# Patient Record
Sex: MALE | Race: WHITE
[De-identification: names, ages, dates, MRNs, and addresses within clinical notes are randomized per-mention and may not be internally consistent; named-entity substitution may affect disease eponyms.]

---

## 2018-08-28 ENCOUNTER — HOSPITAL ENCOUNTER (EMERGENCY)
Dept: HOSPITAL 63 - ER | Age: 48
LOS: 1 days | Discharge: HOME | End: 2018-08-29
Payer: COMMERCIAL

## 2018-08-28 VITALS — HEIGHT: 70 IN | BODY MASS INDEX: 36.74 KG/M2 | WEIGHT: 256.62 LBS

## 2018-08-28 DIAGNOSIS — N13.2: Primary | ICD-10-CM

## 2018-08-28 DIAGNOSIS — M19.90: ICD-10-CM

## 2018-08-28 LAB
ALBUMIN SERPL-MCNC: 4.1 G/DL (ref 3.4–5)
ALBUMIN/GLOB SERPL: 1.2 {RATIO} (ref 1–1.7)
ALP SERPL-CCNC: 123 U/L (ref 46–116)
ALT SERPL-CCNC: 111 U/L (ref 16–63)
AMORPH SED URNS QL MICRO: PRESENT /HPF
ANION GAP SERPL CALC-SCNC: 8 MMOL/L (ref 6–14)
APTT PPP: (no result) S
AST SERPL-CCNC: 67 U/L (ref 15–37)
BACTERIA #/AREA URNS HPF: 0 /HPF
BASOPHILS # BLD AUTO: 0.1 X10^3/UL (ref 0–0.2)
BASOPHILS NFR BLD: 1 % (ref 0–3)
BILIRUB SERPL-MCNC: 0.7 MG/DL (ref 0.2–1)
BILIRUB UR QL STRIP: (no result)
BUN/CREAT SERPL: 23 (ref 6–20)
CA-I SERPL ISE-MCNC: 21 MG/DL (ref 8–26)
CALCIUM SERPL-MCNC: 9.4 MG/DL (ref 8.5–10.1)
CHLORIDE SERPL-SCNC: 105 MMOL/L (ref 98–107)
CO2 SERPL-SCNC: 27 MMOL/L (ref 21–32)
CREAT SERPL-MCNC: 0.9 MG/DL (ref 0.7–1.3)
EOSINOPHIL NFR BLD: 0.2 X10^3/UL (ref 0–0.7)
EOSINOPHIL NFR BLD: 2 % (ref 0–3)
ERYTHROCYTE [DISTWIDTH] IN BLOOD BY AUTOMATED COUNT: 13.3 % (ref 11.5–14.5)
FIBRINOGEN PPP-MCNC: (no result) MG/DL
GFR SERPLBLD BASED ON 1.73 SQ M-ARVRAT: 90.8 ML/MIN
GLOBULIN SER-MCNC: 3.5 G/DL (ref 2.2–3.8)
GLUCOSE SERPL-MCNC: 129 MG/DL (ref 70–99)
GLUCOSE UR STRIP-MCNC: (no result) MG/DL
HCT VFR BLD CALC: 47 % (ref 39–53)
HGB BLD-MCNC: 16.4 G/DL (ref 13–17.5)
LIPASE: 95 U/L (ref 73–393)
LYMPHOCYTES # BLD: 3.9 X10^3/UL (ref 1–4.8)
LYMPHOCYTES NFR BLD AUTO: 35 % (ref 24–48)
MCH RBC QN AUTO: 29 PG (ref 25–35)
MCHC RBC AUTO-ENTMCNC: 35 G/DL (ref 31–37)
MCV RBC AUTO: 83 FL (ref 79–100)
MONO #: 1 X10^3/UL (ref 0–1.1)
MONOCYTES NFR BLD: 9 % (ref 0–9)
NEUT #: 6 X10^3UL (ref 1.8–7.7)
NEUTROPHILS NFR BLD AUTO: 54 % (ref 31–73)
NITRITE UR QL STRIP: (no result)
PLATELET # BLD AUTO: 263 X10^3/UL (ref 140–400)
POTASSIUM SERPL-SCNC: 4.8 MMOL/L (ref 3.5–5.1)
PROT SERPL-MCNC: 7.6 G/DL (ref 6.4–8.2)
RBC # BLD AUTO: 5.69 X10^6/UL (ref 4.3–5.7)
RBC #/AREA URNS HPF: >40 /HPF (ref 0–2)
SODIUM SERPL-SCNC: 140 MMOL/L (ref 136–145)
SP GR UR STRIP: 1.02
SQUAMOUS #/AREA URNS LPF: (no result) /LPF
UROBILINOGEN UR-MCNC: 1 MG/DL
WBC # BLD AUTO: 11.2 X10^3/UL (ref 4–11)
WBC #/AREA URNS HPF: (no result) /HPF (ref 0–4)

## 2018-08-28 PROCEDURE — 85025 COMPLETE CBC W/AUTO DIFF WBC: CPT

## 2018-08-28 PROCEDURE — 96376 TX/PRO/DX INJ SAME DRUG ADON: CPT

## 2018-08-28 PROCEDURE — 99285 EMERGENCY DEPT VISIT HI MDM: CPT

## 2018-08-28 PROCEDURE — 81001 URINALYSIS AUTO W/SCOPE: CPT

## 2018-08-28 PROCEDURE — 96375 TX/PRO/DX INJ NEW DRUG ADDON: CPT

## 2018-08-28 PROCEDURE — 83690 ASSAY OF LIPASE: CPT

## 2018-08-28 PROCEDURE — 36415 COLL VENOUS BLD VENIPUNCTURE: CPT

## 2018-08-28 PROCEDURE — 74176 CT ABD & PELVIS W/O CONTRAST: CPT

## 2018-08-28 PROCEDURE — 80053 COMPREHEN METABOLIC PANEL: CPT

## 2018-08-28 PROCEDURE — 96374 THER/PROPH/DIAG INJ IV PUSH: CPT

## 2018-08-28 NOTE — ED.ADGEN
Past History


Past Medical History:  Arthritis


Past Surgical History:  No Surgical History


Smoking:  Non-smoker


Alcohol Use:  None


Drug Use:  None





Adult General


Chief Complaint


Chief Complaint


LLQ pain





HPI


HPI


Patient was well until hour ago when he had sudden onset of left lower quadrant 

pain. Pain was sharp, constant, unrelenting, 9/10 severity. Pain is non 

radiating. He's had nausea associated with the pain, no vomiting. He's noted a 

change in color of his urine. His urine is more dark. He has no prior history 

of kidney stones.





Review of Systems


Review of Systems





Constitutional: Denies fever or chills 


Eyes: Denies change in visual acuity, redness, or eye pain


HENT: Denies nasal congestion or sore throat 


Respiratory: Denies cough or shortness of breath 


Cardiovascular: Denies chest pain or palpitations


GI: with LLQ abdominal pain, nausea, no vomiting, bloody stools or diarrhea. No 

flank pain


: Denies dysuria or hematuria 


Musculoskeletal: Denies back pain or joint pain


Integument: Denies rash or skin lesions 


Neurologic: Denies headache, focal weakness or sensory changes 


Endocrine: Denies polyuria or polydipsia 





All other systems were reviewed and found to be within normal limits, except as 

documented in this note.





Current Medications


Current Medications





Current Medications








 Medications


  (Trade)  Dose


 Ordered  Sig/Zabrina  Start Time


 Stop Time Status Last Admin


Dose Admin


 


 Acetaminophen/


 Hydrocodone Bitart


  (Lortab 5/325)  1 tab  1X  ONCE  18 00:15


 18 00:29 DC 18 00:26


1 TAB


 


 Ceftriaxone


 Sodium 1 gm/


 Sodium Chloride  50 ml @ 


 100 mls/hr  1X  ONCE  18 22:45


 18 22:45 DC  





 


 Ceftriaxone Sodium


  (Rocephin)  1 gm  1X  ONCE  18 22:45


 18 22:46 DC 18 23:17


1 GM


 


 Ketorolac


 Tromethamine


  (Toradol 30mg


 Vial)  30 mg  1X  ONCE  18 21:00


 18 21:01 DC 18 21:22


30 MG


 


 Morphine Sulfate


  (Morphine 4mg


 Syringe)  8 mg  1X  ONCE  18 23:00


 18 23:07 DC 18 23:22


8 MG


 


 Ondansetron HCl


  (Zofran)  4 mg  1X  ONCE  18 21:00


 18 21:01 DC 18 21:15


4 MG


 


 Sodium Chloride  1,000 ml @ 


 1,000 mls/hr  1X  ONCE  18 23:00


 18 23:59 DC 18 23:00


1,000 MLS/HR


 


 Tamsulosin HCl


  (Flomax)  0.4 mg  1X  ONCE  18 22:45


 18 22:46 DC 18 23:16


0.4 MG











Allergies


Allergies





Allergies








Coded Allergies Type Severity Reaction Last Updated Verified


 


  No Known Drug Allergies    7/7/15 No











Physical Exam


Physical Exam





Constitutional: Well developed, well nourished, no acute distress, non-toxic 

appearance. 


HENT: Normocephalic, atraumatic, bilateral external ears normal, oropharynx 

moist, no oral exudates, nose normal. 


Eyes: PERRLA, EOMI, conjunctiva normal, no discharge. 


Neck: Normal range of motion, no tenderness, supple, no stridor. 


Cardiovascular:Heart rate regular rhythm, no murmur 


Lungs & Thorax:  Bilateral breath sounds clear to auscultation 


Abdomen: Bowel sounds normal, soft, with LLQ tenderness, no masses, no 

pulsatile masses. 


Skin: Warm, dry, no erythema, no rash. 


Back: No tenderness, no CVA tenderness. 


Extremities: No tenderness, no cyanosis, no clubbing, ROM intact, no edema. 


Neurologic: Alert and oriented X 3, normal motor function, normal sensory 

function, no focal deficits noted. 


Psychologic: Affect normal, judgement normal, mood normal.





Current Patient Data


Vital Signs





 Vital Signs








  Date Time  Temp Pulse Resp B/P (MAP) Pulse Ox O2 Delivery O2 Flow Rate FiO2


 


18 00:26   19  97 Room Air  


 


18 00:25  62  149/83 (105)    


 


18 23:30       2.0 


 


18 20:34 97.8       








Lab Results





 Laboratory Tests








Test


 18


20:45 18


21:12


 


Urine Collection Type Unknown   


 


Urine Color Brown   


 


Urine Clarity Bloody   


 


Urine pH 7.0   


 


Urine Specific Gravity 1.025   


 


Urine Protein


 >100 mg/dl


(NEG-TRACE) 





 


Urine Glucose (UA)


 Neg mg/dL


(NEG) 





 


Urine Ketones (Stick)


 Trace mg/dL


(NEG) 





 


Urine Blood Large (NEG)   


 


Urine Nitrite Neg (NEG)   


 


Urine Bilirubin Neg (NEG)   


 


Urine Urobilinogen Dipstick


 1 mg/dL (0.2


mg/dL) 





 


Urine Leukocyte Esterase Neg (NEG)   


 


Urine RBC


 >40 /HPF (0-2)


 





 


Urine WBC


 1-4 /HPF (0-4)


 





 


Urine Squamous Epithelial


Cells Occ /LPF  


 





 


Urine Amorphous Sediment Present /HPF   


 


Urine Bacteria


 0 /HPF (0-FEW)


 





 


White Blood Count


 


 11.2 x10^3/uL


(4.0-11.0)  H


 


Red Blood Count


 


 5.69 x10^6/uL


(4.30-5.70)


 


Hemoglobin


 


 16.4 g/dL


(13.0-17.5)


 


Hematocrit


 


 47.0 %


(39.0-53.0)


 


Mean Corpuscular Volume


 


 83 fL ()





 


Mean Corpuscular Hemoglobin  29 pg (25-35)  


 


Mean Corpuscular Hemoglobin


Concent 


 35 g/dL


(31-37)


 


Red Cell Distribution Width


 


 13.3 %


(11.5-14.5)


 


Platelet Count


 


 263 x10^3/uL


(140-400)


 


Neutrophils (%) (Auto)  54 % (31-73)  


 


Lymphocytes (%) (Auto)  35 % (24-48)  


 


Monocytes (%) (Auto)  9 % (0-9)  


 


Eosinophils (%) (Auto)  2 % (0-3)  


 


Basophils (%) (Auto)  1 % (0-3)  


 


Neutrophils # (Auto)


 


 6.0 x10^3uL


(1.8-7.7)


 


Lymphocytes # (Auto)


 


 3.9 x10^3/uL


(1.0-4.8)


 


Monocytes # (Auto)


 


 1.0 x10^3/uL


(0.0-1.1)


 


Eosinophils # (Auto)


 


 0.2 x10^3/uL


(0.0-0.7)


 


Basophils # (Auto)


 


 0.1 x10^3/uL


(0.0-0.2)


 


Sodium Level


 


 140 mmol/L


(136-145)


 


Potassium Level


 


 4.8 mmol/L


(3.5-5.1)


 


Chloride Level


 


 105 mmol/L


()


 


Carbon Dioxide Level


 


 27 mmol/L


(21-32)


 


Anion Gap  8 (6-14)  


 


Blood Urea Nitrogen


 


 21 mg/dL


(8-26)


 


Creatinine


 


 0.9 mg/dL


(0.7-1.3)


 


Estimated GFR


(Cockcroft-Gault) 


 90.8  





 


BUN/Creatinine Ratio  23 (6-20)  H


 


Glucose Level


 


 129 mg/dL


(70-99)  H


 


Calcium Level


 


 9.4 mg/dL


(8.5-10.1)


 


Total Bilirubin


 


 0.7 mg/dL


(0.2-1.0)


 


Aspartate Amino Transferase


(AST) 


 67 U/L (15-37)


H


 


Alanine Aminotransferase (ALT)


 


 111 U/L


(16-63)  H


 


Alkaline Phosphatase


 


 123 U/L


()  H


 


Total Protein


 


 7.6 g/dL


(6.4-8.2)


 


Albumin


 


 4.1 g/dL


(3.4-5.0)


 


Albumin/Globulin Ratio  1.2 (1.0-1.7)  


 


Lipase


 


 95 U/L


()











EKG


EKG


[]





Radiology/Procedures


Radiology/Procedures


 [facility] [address], [place] [address]


 [phone]


 


 IMAGING REPORT





 Signed





PATIENT: CALEB FRANKLIN ACCOUNT: [id number] MRN#: [id number]


: 1970 LOCATION: ER AGE: 48


SEX: M EXAM DT: 18 ACCESSION#: [id number]


STATUS: REG ER ORD. PHYSICIAN: ALEJANDRA KESSLER MD 


REASON: Left flank pain


PROCEDURE: CT ABDOMEN PELVIS WO CONTRAST





PQRS Compliance statement:


 


One or more of the following individualized dose reduction techniques were


utilized for this examination:


1. Automated exposure control.


2. Adjustment of the mA and/or kV according to patient size.


3. Use of iterative reconstruction technique.


 


Indication:LLQ ABDOMINAL PAIN<BR>NO HX OF STONE IN PAST


 


TECHNIQUE: CT abdomen and pelvis without IV contrast with multiplanar 


reformats.


 


COMPARISON: None


 


FINDINGS:


Limited evaluation of solid abdominal and pelvic organs due to lack of IV 


contrast. Heart is normal in size. No pericardial or pleural effusion. 


Clear lung bases. Noncontrast appearance of the liver, spleen, 


gallbladder, pancreas, adrenals within normal limits. 2.6 cm low 


attenuating lesion is seen in the interpolar right kidney demonstrating 


fluid density compatible with simple cysts. 5 mm stone is seen in the 


distal left ureter causing mild hydroureteronephrosis. No nephrolithiasis.


Small bilateral fat-containing hernia. No enlarged retroperitoneal or 


pelvic adenopathy. No bowel obstruction. Urinary bladder demonstrates no 


radiopaque stone. There is subtle lucency seen along the anterior urinary 


bladder wall. No perivesical inflammatory changes. The prostate and 


seminal vesicles show no large mass. No suspicious bony lesion.


 


IMPRESSION:


1. Obstructing 5 mm stone in the left distal ureter.


2. Subtle lucency seen along the anterior urinary bladder wall which may 


represent submucosal fat infiltration or emphysema. Clinically correlate 


with urinalysis for cystitis.


 


Electronically signed by: Steve Worrell DO (2018 10:07 PM) [facility]














DICTATED AND SIGNED BY:     STEVE WORRELL DO


DATE:     18





CC: ALEJANDRA KESSLER MD; ASIA BECKETT MD ~





Course & Med Decision Making


Course & Med Decision Making


Emergency department course


Patient presents with left lower abdominal pain


DDx-the stone, diverticulitis, colitis, constipation, UTI





Patient was stable in the emergency department, improved after IV Toradol, 

morphine, normal saline and Zofran. Patient had near resolution of his pain. 

Labs were remarkable for mild leukocytosis and slightly elevated LFTs. UA 

showed hematuria. CT abdomen and pelvis without contrast showed left distal UVJ 

stone 5 mm with bladder wall motion consistent with cystitis. Patient was given 

Rocephin IV and urine sent for culture





23:40


Patient improved after second dose of morphine abdominal pain which from 7/10 

to 4/10 severity. Patient will follow-up with outpatient urology evaluation 

with Dr. Caleb Horn.


Patient was given prescriptions for Flomax, Keflex, Norco, Motrin and Zofran.





Final Impression


Final Impression


Clinical Impression


Left renal colic


Left ureteral stone





Dragon Disclaimer


Dragon Disclaimer


This electronic medical record was generated, in whole or in part, using a 

voice recognition dictation system.





Departure


Departure:


Impression:  


 Primary Impression:  


 Left ureteral calculus


 Additional Impression:  


 Renal colic on left side


Disposition:  01 HOME, SELF-CARE


Condition:  STABLE


Referrals:  


CALEB HORN MD


Follow-up topmorrow for further evaluation


Patient Instructions:  Diet for Kidney Stones, Kidney Stones, Easy-to-Read





Additional Instructions:  


Follow-up tomorrow for further evaluation


If you develop worse pain, vomiting, fevers return to the emergency department 

immediately


Scripts


Cephalexin (KEFLEX) 500 Mg Capsule


500 MG PO TID for 7 Days, #21 CAP


   Prov: ALEJANDRA KESSLER MD         18 


Ondansetron Hcl (ZOFRAN) 4 Mg Tablet


1 TAB PO Q6HRS, #9 TAB


   Prov: ALEJANDRA KESSLER MD         18 


Tamsulosin Hcl (FLOMAX) 0.4 Mg Cap.er.24h


1 CAP PO DAILY, #10 CAP 0 Refills


   Prov: ALEJANDRA KESSLER MD         18 


Ibuprofen (IBUPROFEN) 800 Mg Tablet


800 MG PO TIDWMEALHC for 5 Days, #15 MG


   Prov: ALEJANDRA KESSLRE MD         18 


Hydrocodone Bit/Acetaminophen (NORCO 5-325 TABLET) 1 Each Tablet


1 TAB PO PRN Q6HRS PRN for PAIN for 5 Days, #20 TAB 0 Refills


   Prov: ALEJANDRA KESSLER MD         18











ALEJANDRA KESSLER MD Aug 28, 2018 20:59

## 2018-08-28 NOTE — RAD
PQRS Compliance statement:

 

One or more of the following individualized dose reduction techniques were

utilized for this examination:

1. Automated exposure control.

2. Adjustment of the mA and/or kV according to patient size.

3. Use of iterative reconstruction technique.

 

Indication:LLQ ABDOMINAL PAIN<BR>NO HX OF STONE IN PAST

 

TECHNIQUE: CT abdomen and pelvis without IV contrast with multiplanar 

reformats.

 

COMPARISON: None

 

FINDINGS:

Limited evaluation of solid abdominal and pelvic organs due to lack of IV 

contrast. Heart is normal in size. No pericardial or pleural effusion. 

Clear lung bases. Noncontrast appearance of the liver, spleen, 

gallbladder, pancreas, adrenals within normal limits. 2.6 cm low 

attenuating lesion is seen in the interpolar right kidney demonstrating 

fluid density compatible with simple cysts. 5 mm stone is seen in the 

distal left ureter causing mild hydroureteronephrosis. No nephrolithiasis.

Small bilateral fat-containing hernia. No enlarged retroperitoneal or 

pelvic adenopathy. No bowel obstruction. Urinary bladder demonstrates no 

radiopaque stone. There is subtle lucency seen along the anterior urinary 

bladder wall. No perivesical inflammatory changes. The prostate and 

seminal vesicles show no large mass. No suspicious bony lesion.

 

IMPRESSION:

1. Obstructing 5 mm stone in the left distal ureter.

2. Subtle lucency seen along the anterior urinary bladder wall which may 

represent submucosal fat infiltration or emphysema. Clinically correlate 

with urinalysis for cystitis.

 

Electronically signed by: Steve Worrell DO (8/28/2018 10:07 PM) [facility]

## 2018-08-29 VITALS — SYSTOLIC BLOOD PRESSURE: 149 MMHG | DIASTOLIC BLOOD PRESSURE: 83 MMHG
